# Patient Record
Sex: FEMALE | Race: WHITE | NOT HISPANIC OR LATINO | URBAN - METROPOLITAN AREA
[De-identification: names, ages, dates, MRNs, and addresses within clinical notes are randomized per-mention and may not be internally consistent; named-entity substitution may affect disease eponyms.]

---

## 2020-01-08 ENCOUNTER — OFFICE (OUTPATIENT)
Dept: URBAN - METROPOLITAN AREA CLINIC 101 | Facility: CLINIC | Age: 60
End: 2020-01-08

## 2020-01-08 VITALS
HEART RATE: 101 BPM | TEMPERATURE: 97.5 F | DIASTOLIC BLOOD PRESSURE: 89 MMHG | HEIGHT: 63 IN | WEIGHT: 195 LBS | SYSTOLIC BLOOD PRESSURE: 114 MMHG | SYSTOLIC BLOOD PRESSURE: 139 MMHG | DIASTOLIC BLOOD PRESSURE: 100 MMHG

## 2020-01-08 DIAGNOSIS — Z98.84 BARIATRIC SURGERY STATUS: ICD-10-CM

## 2020-01-08 DIAGNOSIS — R14.2 ERUCTATION: ICD-10-CM

## 2020-01-08 DIAGNOSIS — Z12.11 ENCOUNTER FOR SCREENING FOR MALIGNANT NEOPLASM OF COLON: ICD-10-CM

## 2020-01-08 DIAGNOSIS — R10.10 UPPER ABDOMINAL PAIN, UNSPECIFIED: ICD-10-CM

## 2020-01-08 PROCEDURE — 99204 OFFICE O/P NEW MOD 45 MIN: CPT

## 2020-01-08 NOTE — SERVICEHPINOTES
MANUELA SLATER   is a   59  female who presents with stomach pain. Has been experiencing upper abdominal pain off and on over 5 months but getting worse over 2 weeks. Describes as gnawing pain and hunger pain. The pain is worse on empty stomach. Took Prilosec yesterday and today and feels better. Hx of gastric bypass surgery in 2004.BRHad similar symptoms before the gastric bypass surgery. BRDenies taking NSAIDs. Takes Tylenol for sciatica pain. BRSlight nausea in the evening without vomiting. + Increased burping. Denies dysphagia or unintentional weight loss. BRMoves bowel daily. Lately slightly constipated since taking TUMs. Denies blood in stool, melena or lower abdominal pain. Occasional diarrhea. BRNever had a colonoscopy. Had a sigmoidoscopy in 2004. BRHx of bowel obstruction after gastric bypass s/p resection.  Denies family hx of colon cancer. BRDenies hx of cardiovascular disease.